# Patient Record
Sex: MALE | Race: WHITE | NOT HISPANIC OR LATINO | Employment: OTHER | RURAL
[De-identification: names, ages, dates, MRNs, and addresses within clinical notes are randomized per-mention and may not be internally consistent; named-entity substitution may affect disease eponyms.]

---

## 2019-02-21 ENCOUNTER — CONSULT (OUTPATIENT)
Dept: CARDIOLOGY | Facility: CLINIC | Age: 84
End: 2019-02-21

## 2019-02-21 VITALS
HEART RATE: 75 BPM | SYSTOLIC BLOOD PRESSURE: 100 MMHG | BODY MASS INDEX: 22.19 KG/M2 | HEIGHT: 70 IN | DIASTOLIC BLOOD PRESSURE: 68 MMHG | WEIGHT: 155 LBS

## 2019-02-21 DIAGNOSIS — I44.2 IDIOVENTRICULAR RHYTHM (HCC): ICD-10-CM

## 2019-02-21 DIAGNOSIS — I47.29 NSVT (NONSUSTAINED VENTRICULAR TACHYCARDIA) (HCC): ICD-10-CM

## 2019-02-21 DIAGNOSIS — I47.1 PSVT (PAROXYSMAL SUPRAVENTRICULAR TACHYCARDIA) (HCC): Primary | ICD-10-CM

## 2019-02-21 PROCEDURE — 99204 OFFICE O/P NEW MOD 45 MIN: CPT | Performed by: INTERNAL MEDICINE

## 2019-02-21 PROCEDURE — 93000 ELECTROCARDIOGRAM COMPLETE: CPT | Performed by: INTERNAL MEDICINE

## 2019-02-21 RX ORDER — TICAGRELOR 90 MG/1
90 TABLET ORAL 2 TIMES DAILY
Refills: 2 | COMMUNITY
Start: 2019-02-05

## 2019-02-21 RX ORDER — PREDNISONE 10 MG/1
20 TABLET ORAL DAILY
Refills: 1 | COMMUNITY
Start: 2019-01-29

## 2019-02-21 NOTE — PROGRESS NOTES
Electrophysiology Consult     Taj Fleming  1935  [unfilled]  [unfilled]    02/21/19    DATE OF ADMISSION: (Not on file)  [facility] CARDIOLOGY    ZeiglerBraxton hamilton MD  [address] [facility] / [place] [id number]    Chief Complaint   Patient presents with   • Irregular Heart Beat     consult     Problem List:  1. NSVT/PSVT  a. ZioPatch 1/7/19 -1/14/19: underlying NSR. Max  bpm. Minimum HR 51 bpm. Average HR 67 bpm. 104 episodes of nonsustained SVT. 4 runs of NSVT. 36 seconds of idioventricular rhythm at 0300 AM.  Rare PACs and PVCs.   2. CAD  a. PtCA/stent to LAD 2006  b. Echocardiogram 2014: Ef 60%, mild MR  c. Stent to LAD and diagonal 2014  d. Postive Myocardial Stress test with subseqent LHC with stent 3/2017  e. C with stent to PDA 1/2018. EF 60%, no wall motion abnormalities  3. HTN  4. HLP  5. GERD  6. Chronic Back Pain   7. Surgical History   a. Tonsillectomy  b. Appendectomy  c. Back surgery  d. Right shouldter replacement            History of Present Illness:   83 year old WM referred by Dr. Leon for abnormal ZioPatch showing runs of nonsustained VT and PSVT. The patient has a history of CAD with several stents in the past, most recently in 1/2018 with preserved EF, HTN, and HLP. Recently, he wore a ZioPatch after complaining of some dysnpnea. It showed some PSVT and NSVT along with idioventricular rythm. The patient was going to started on a beta blocker by Dr. Leon at the end of January, however he did not due to previous bradycardia on BB. His symptoms are significant fatigue and dysnpnea on exertion. He can only walk about 50 feet before he gets tired. He attributes a lot of his symptoms to his chronic back pain.  Symptoms are severe in severity. He denies any chest pain, dizziness, syncope. Worsened by exertion. Relieved by rest. Occurs every time he tries to do any activity.  This has been worsening over the past 6 months after he had pneumonia.  He  is very sedentary and is in a wheelchair today. No tobacco. He drinks one cup of coffee daily. He rarely drinks ETOH.      No Known Allergies     Cannot display prior to admission medications because the patient has not been admitted in this contact.            Current Outpatient Medications:   •  BRILINTA 90 MG tablet tablet, Take 90 mg by mouth 2 (Two) Times a Day., Disp: , Rfl: 2  •  esomeprazole (nexIUM) 20 MG capsule, Take 20 mg by mouth Daily., Disp: , Rfl: 5  •  predniSONE (DELTASONE) 10 MG tablet, Take 20 mg by mouth Daily., Disp: , Rfl: 1    Social History     Socioeconomic History   • Marital status:      Spouse name: Not on file   • Number of children: Not on file   • Years of education: Not on file   • Highest education level: Not on file   Tobacco Use   • Smoking status: Never Smoker   • Smokeless tobacco: Never Used   Substance and Sexual Activity   • Alcohol use: Yes   • Drug use: No   • Sexual activity: Defer       Family History: not pertinent    REVIEW OF SYSTEMS:   CONST:  No weight loss, fever, chills, + weakness + fatigue.   HEENT:  No visual loss, blurred vision, double vision, yellow sclerae.                   No hearing loss, congestion, sore throat.   SKIN:      No rashes, urticaria, ulcers, sores.     RESP:     + shortness of breath, - hemoptysis, cough, sputum.   GI:           No anorexia, nausea, vomiting, diarrhea. No abdominal pain, melena.   :         No burning on urination, hematuria or increased frequency.  ENDO:    No diaphoresis, cold or heat intolerance. No polyuria or polydipsia.   NEURO:  No headache, dizziness, syncope, paralysis, ataxia, or parasthesias.                  No change in bowel or bladder control. No history of CVA/TIA  MUSC:    No muscle, back pain, joint pain or stiffness.   HEME:    No anemia, bleeding, bruising. No history of DVT/PE.  PSYCH:  No history of depression, anxiety    Vitals:    02/21/19 0951   BP: 100/68   BP Location: Left arm   Patient  "Position: Sitting   Pulse: 75   Weight: 70.3 kg (155 lb)   Height: 177.8 cm (70\")                 Physical Exam:  GEN: Well nourished, well-developed, no acute distress  HEENT: Normocephalic, atraumatic, PERRLA, moist mucous membranes  NECK: Supple, NO JVD, no thyromegaly, no lymphadenopathy  CARD: S1S2, RRR, + S4, PMI NL  LUNGS: Clear to auscultation, normal respiratory effort  ABDOMEN: Soft, nontender, normal bowel sounds  EXTREMITIES: No gross deformities, no clubbing, cyanosis, or edema  SKIN: Warm, dry, no lesions  NEURO: No focal deficits, alert and oriented x 3  PSYCHIATRIC: Normal affect and mood      I personally viewed and interpreted the patient's EKG/Telemetry/lab data      ECG 12 Lead  Date/Time: 2/21/2019 10:08 AM  Performed by: Bimal Baeza MD  Authorized by: Bimal Baeza MD   Comparison: not compared with previous ECG   Rhythm: sinus rhythm  BPM: 75                ICD-10-CM ICD-9-CM   1. PSVT (paroxysmal supraventricular tachycardia) (CMS/HCC) I47.1 427.0   2. NSVT (nonsustained ventricular tachycardia) (CMS/HCC) I47.2 427.1   3. Idioventricular rhythm (CMS/HCC) I44.2 426.89       Assessment and Plan:   1. PSVT:  - short paroxysmal episodes noted on recent ZioPatch mostly PAC and NSAT. Patient does not feel palpitations, and we do not suspect these short episodes are causing his fatigue and SOB. Would not treat at this time.    2. NSVT  - in setting of normal EF, and he is asymptomatic, would monitor for now. His risk of SCD is low.     3. Idioventricular rhythm:  - as per #1 and #2, monitor as his risk is low.     4. CAD:  - continue to follow with Dr. Leon. Continue Brillinta.     Scribed for Bimal Baeza MD by Josephine Burleson PA-C. 2/21/2019  10:25 AM     Biaml BLUM MD, personally performed the services face to face as described and documented by the above named individual. I have made any necessary edits and it is both accurate and complete 2/21/2019  10:31 AM      "